# Patient Record
(demographics unavailable — no encounter records)

---

## 2024-11-23 NOTE — HISTORY OF PRESENT ILLNESS
[TextBox_4] : 32-year-old male with history of hyperreactive airways disease presents for follow-up.  Patient complains of occasional SOB with nasal congestion without cough, chest pain, hemoptysis, night sweats or weight loss.  He states his symptoms are worse in the summer and recently have improved.  He continues to use montelukast daily with occasional albuterol and nasal saline. [TextBox_29] : Denies snoring, daytime somnolence, apneic episodes, AM headaches

## 2024-11-23 NOTE — REVIEW OF SYSTEMS
[Nasal Congestion] : nasal congestion [Postnasal Drip] : postnasal drip [Cough] : no cough [Sputum] : no sputum [Dyspnea] : dyspnea [SOB on Exertion] : no sob on exertion [GERD] : gerd [Negative] : Endocrine

## 2024-11-23 NOTE — DISCUSSION/SUMMARY
[FreeTextEntry1] : 32-year-old male with history of mild hyperreactive airway disease with related complaints and rhinitis.  He was given a 2-week sample of Trelegy.  He is continue use of montelukast daily with albuterol as needed.  Use of nasal saline 3-4 times daily was also recommended.  Treatment adjustment will depend on symptomatic needs.  He is to follow-up with his PMD as before.

## 2025-06-01 NOTE — DISCUSSION/SUMMARY
[FreeTextEntry1] : 32-year-old male with history of hyperreactive airways disease and rhinitis, at baseline.  He is to continue use of Advair twice daily with montelukast and Flonase daily ,with albuterol as needed.  PFTs will be repeated in future.  He is to follow-up with his PMD as before.

## 2025-06-01 NOTE — HISTORY OF PRESENT ILLNESS
[Never] : never [TextBox_4] : 33-year-old male with history of hyperreactive airways disease presents for follow-up.  Patient is "okay", continuing to complain of nasal congestion and discharge  without cough, chest pain, hemoptysis, night sweats or weight loss.  He uses both Advair and albuterol on occasion with daily montelukast however. [TextBox_29] : Denies snoring, daytime somnolence, apneic episodes, AM headaches

## 2025-06-01 NOTE — REVIEW OF SYSTEMS
[Nasal Congestion] : nasal congestion [Postnasal Drip] : postnasal drip [Cough] : no cough [Sputum] : no sputum [Dyspnea] : no dyspnea [SOB on Exertion] : no sob on exertion [GERD] : gerd [Negative] : Endocrine